# Patient Record
Sex: MALE | Race: WHITE | URBAN - METROPOLITAN AREA
[De-identification: names, ages, dates, MRNs, and addresses within clinical notes are randomized per-mention and may not be internally consistent; named-entity substitution may affect disease eponyms.]

---

## 2017-03-21 ENCOUNTER — TELEPHONE (OUTPATIENT)
Dept: FAMILY MEDICINE | Facility: OTHER | Age: 37
End: 2017-03-21

## 2017-03-21 NOTE — TELEPHONE ENCOUNTER
Spoke with patient, he has a narrow network plan however was referred to dental center [facility] D[facility] as he needs a mandibular repositioning device.  I'm looking into for him.

## 2017-03-29 NOTE — TELEPHONE ENCOUNTER
email rec'd back from our member services department, due to circumstance they will authorize this, it is handled with them being it is a narrow network plan.

## 2017-07-25 ENCOUNTER — TELEPHONE (OUTPATIENT)
Dept: FAMILY MEDICINE | Facility: OTHER | Age: 37
End: 2017-07-25

## 2017-07-25 NOTE — TELEPHONE ENCOUNTER
Reason for call:  Form  Reason for Call:  Form, our goal is to have forms completed with 72 hours, however, some forms may require a visit or additional information.    Type of letter, form or note:  medical    Who is the form from?: Patient    Where did the form come from: Patient or family brought in       What clinic location was the form placed at?: [facility] - [phone]    Where the form was placed: [facility]    What number is listed as a contact on the form?: [phone]       Additional comments: please fax to [facility] when done    Call taken on 7/25/2017 at 1:29 PM by Maryam Montano

## 2018-04-27 ENCOUNTER — TELEPHONE (OUTPATIENT)
Dept: FAMILY MEDICINE | Facility: OTHER | Age: 38
End: 2018-04-27

## 2018-04-27 DIAGNOSIS — G47.33 OSA (OBSTRUCTIVE SLEEP APNEA): Primary | ICD-10-CM

## 2018-04-27 NOTE — TELEPHONE ENCOUNTER
Reason for Call: Request for an order or referral:    Order or referral being requested: Dr Fra[facility] dentist/[facility]    Date needed: as soon as possible    Has the patient been seen by the PCP for this problem? YES    Additional comments: [facility] calling stating they cannot accept referrals past 180 days. The referral was from 11-1-16 to 12-31-16. Call provider services please [phone]    Phone number Patient can be reached at:  Other phone number:  Dianna Salas calling. Provider services number is [phone]    Best Time:  any    Can we leave a detailed message on this number?  YES    Call taken on 4/27/2018 at 3:49 PM by Cathy Person

## 2018-04-30 NOTE — TELEPHONE ENCOUNTER
Reason for call:  Patient states that he was told that we would have to log into the [facility] provider portal and do an on line referral.  Once this done please get in touch with the patient.  Ok to leave a detailed message.

## 2018-04-30 NOTE — TELEPHONE ENCOUNTER
Please call patient to see if he still wants referral sent to Dr Fraire for sleep apnea. It sounds like he will pay more out of pocket. Let me know if there is any follow up I need to do.  Thanks,  Chula Tillman, CNP

## 2018-04-30 NOTE — TELEPHONE ENCOUNTER
FYI-This patient's insurance gives the highest coverage if he stays within the [facility] network.  He will pay a higher out of pocket cost due to the provider being out of the  network.  We can't do a referral to get better coverage as we offer these services.

## 2018-05-01 NOTE — TELEPHONE ENCOUNTER
Spoke to patient, patient states he has been struggling to get form faxed to [facility]. Form has been faxed a couple times and [facility] is stating that they do not have form. Patient is a little frustrated with [facility] not receiving his forms, states when he does fax, he does not get confirmation that fax went through. Offered to have patient drop off form and we can try to fax it from clinic.

## 2018-05-01 NOTE — TELEPHONE ENCOUNTER
Please call patient and see if there is a form that can be faxed for the referral as I do not have access to [facility] portal.  Thanks,  Chula Tillman, CNP

## 2018-05-22 NOTE — TELEPHONE ENCOUNTER
Copied and pasted message from 3/21/17 encounter:   email rec'd back from our member services department, due to circumstance they will authorize this, it is handled with them being it is a narrow network plan.     Referral was faxed to [facility].